# Patient Record
Sex: MALE | Race: WHITE
[De-identification: names, ages, dates, MRNs, and addresses within clinical notes are randomized per-mention and may not be internally consistent; named-entity substitution may affect disease eponyms.]

---

## 2019-11-25 ENCOUNTER — HOSPITAL ENCOUNTER (OUTPATIENT)
Dept: HOSPITAL 53 - M RAD | Age: 64
End: 2019-11-25
Attending: SPECIALIST
Payer: COMMERCIAL

## 2019-11-25 DIAGNOSIS — Z87.891: Primary | ICD-10-CM

## 2019-11-25 NOTE — REP
Clinical:  Lung screening.  History of lung cancer.

 

Comparison:  None available

 

Technique:  Axial low-dose noncontrast images from the thoracic inlet to the

upper abdomen using lung screening technique.

 

Findings:

The lung fields are well-aerated. No consolidation, significant nodule or mass

lesion is appreciated.  No pleural effusion/reaction or pneumothorax.

Tracheobronchial tree is patent.  Mediastinum demonstrates mild atherosclerotic

changes of the coronary arteries without cardiomegaly.

 

Impression:

Lung-RADS category II-C.  No nodule or suspicious abnormality.

Management recommendations include annual low-dose CT reevaluation.

 

 

Electronically Signed by

Altaf Weaver MD 11/25/2019 04:12 P